# Patient Record
Sex: MALE | Race: WHITE | URBAN - METROPOLITAN AREA
[De-identification: names, ages, dates, MRNs, and addresses within clinical notes are randomized per-mention and may not be internally consistent; named-entity substitution may affect disease eponyms.]

---

## 2017-03-28 ENCOUNTER — IMPORTED ENCOUNTER (OUTPATIENT)
Dept: URBAN - METROPOLITAN AREA CLINIC 50 | Facility: CLINIC | Age: 77
End: 2017-03-28

## 2018-03-13 ENCOUNTER — IMPORTED ENCOUNTER (OUTPATIENT)
Dept: URBAN - METROPOLITAN AREA CLINIC 50 | Facility: CLINIC | Age: 78
End: 2018-03-13

## 2019-03-12 ENCOUNTER — IMPORTED ENCOUNTER (OUTPATIENT)
Dept: URBAN - METROPOLITAN AREA CLINIC 50 | Facility: CLINIC | Age: 79
End: 2019-03-12

## 2020-03-24 ENCOUNTER — IMPORTED ENCOUNTER (OUTPATIENT)
Dept: URBAN - METROPOLITAN AREA CLINIC 50 | Facility: CLINIC | Age: 80
End: 2020-03-24

## 2020-03-31 ENCOUNTER — IMPORTED ENCOUNTER (OUTPATIENT)
Dept: URBAN - METROPOLITAN AREA CLINIC 50 | Facility: CLINIC | Age: 80
End: 2020-03-31

## 2020-05-12 ENCOUNTER — IMPORTED ENCOUNTER (OUTPATIENT)
Dept: URBAN - METROPOLITAN AREA CLINIC 50 | Facility: CLINIC | Age: 80
End: 2020-05-12

## 2020-05-26 ENCOUNTER — IMPORTED ENCOUNTER (OUTPATIENT)
Dept: URBAN - METROPOLITAN AREA CLINIC 50 | Facility: CLINIC | Age: 80
End: 2020-05-26

## 2020-10-07 NOTE — PATIENT DISCUSSION
GLAUCOMA SUSPECT, OU : ENLARGED CUP TO [address] [facility], . RETURN FOR POSITIVE FAMILY HISTORY. FOLLOW UP AS SCHEUDLED.

## 2020-10-07 NOTE — PATIENT DISCUSSION
[facility] Note:  Renal Adjustment for cefepime (MAXIPIME)    Chelsi Austin is a 67year old male who has been prescribed cefepime (MAXIPIME) 1 g every 8 hrs. CrCl is estimated creatinine clearance is 37.5 mL/min (based on SCr of 1.49 mg/dL).  so the schedule VF prior to next appointment

## 2021-03-31 NOTE — PATIENT DISCUSSION
Reviewed OCT with patient, no change since 2016. Sister has history of a tumor behind eye. Performed VF OU today, problem with machine and was only able to print OD. OS was full. Repeat yearly.

## 2021-04-17 ASSESSMENT — TONOMETRY
OS_IOP_MMHG: 14
OD_IOP_MMHG: 14
OS_IOP_MMHG: 14
OS_IOP_MMHG: 14
OD_IOP_MMHG: 14
OS_IOP_MMHG: 12
OS_IOP_MMHG: 15
OS_IOP_MMHG: 14
OD_IOP_MMHG: 14
OD_IOP_MMHG: 14
OS_IOP_MMHG: 14
OD_IOP_MMHG: 16
OD_IOP_MMHG: 14
OD_IOP_MMHG: 14

## 2021-04-17 ASSESSMENT — VISUAL ACUITY
OS_CC: 20/100-1
OD_BAT: 20/70
OD_OTHER: 20/70. >20/400.
OD_CC: J1+@ 16 IN
OD_OTHER: 20/70. >20/400.
OS_OTHER: 20/50. 20/70.
OD_CC: 20/50+1
OS_CC: 20/30
OD_PH: 20/40+1
OS_CC: 20/30
OD_PH: 20/25
OD_BAT: 20/30
OD_BAT: 20/40
OD_OTHER: 20/30. 20/40.
OD_CC: 20/30
OS_BAT: 20/200
OD_CC: 20/25-
OD_SC: 20/25-1
OD_BAT: 20/40
OS_PH: 20/25
OD_OTHER: 20/40. 20/80.
OD_CC: 20/40
OS_SC: 20/25-1
OD_OTHER: 20/40. 20/60.
OS_OTHER: 20/200. >20/400.
OD_CC: 20/30+
OD_PH: 20/30-1
OD_PH: @ 16 IN
OS_CC: J2-@ 16 IN
OS_CC: J1
OS_CC: 20/40
OD_CC: J2-@ 16 IN
OS_BAT: 20/50
OS_OTHER: 20/50. 20/70.
OS_CC: J1+@ 16 IN
OD_BAT: 20/70
OS_PH: @ 16 IN
OD_CC: 20/50+1
OS_BAT: 20/50
OS_CC: 20/30
OS_BAT: 20/50
OS_OTHER: 20/50. 20/100.
OD_CC: J1
OS_CC: 20/30-1

## 2021-09-29 NOTE — PATIENT DISCUSSION
Reviewed OCT with patient, no change since 2016. Sister has history of a tumor behind eye. VF performed previous visit, problem with machine and was only able to print OD. OS was full. Repeat in 6 months, NO CHARGE.

## 2022-03-02 NOTE — PATIENT DISCUSSION
Follow complaints, not ready for surgery. Recommend +1.00 OTC readers for some of her distance, she does not want to wear bifocals at this time. Patient has history of wearing near contact lens OD. Encouraged patient to play with placing the contact in left eye. +2.00 sphere.

## 2022-03-02 NOTE — PATIENT DISCUSSION
Dr. Fernanda Woods reviewed patient's chart and agreed with Dr. Katlin Maldonado treatment plan. Continue with no drop treatment, follow pressures closely.

## 2022-09-07 NOTE — PATIENT DISCUSSION
Dr. Leah Kendall reviewed patient's chart and agreed with Dr. Curry Marrufo treatment plan. Continue with no drop treatment, follow pressures closely.

## 2023-05-31 NOTE — PATIENT DISCUSSION
General: Doxycycline Pregnancy And Lactation Text: This medication is Pregnancy Category D and not consider safe during pregnancy. It is also excreted in breast milk but is considered safe for shorter treatment courses.